# Patient Record
Sex: FEMALE | Race: WHITE | NOT HISPANIC OR LATINO | Employment: OTHER | URBAN - METROPOLITAN AREA
[De-identification: names, ages, dates, MRNs, and addresses within clinical notes are randomized per-mention and may not be internally consistent; named-entity substitution may affect disease eponyms.]

---

## 2017-02-20 ENCOUNTER — HISTORICAL (OUTPATIENT)
Dept: LAB | Facility: HOSPITAL | Age: 67
End: 2017-02-20

## 2021-01-04 ENCOUNTER — HISTORICAL (OUTPATIENT)
Dept: RADIOLOGY | Facility: HOSPITAL | Age: 71
End: 2021-01-04

## 2021-07-16 ENCOUNTER — HISTORICAL (OUTPATIENT)
Dept: RADIOLOGY | Facility: HOSPITAL | Age: 71
End: 2021-07-16

## 2022-03-04 ENCOUNTER — HISTORICAL (OUTPATIENT)
Dept: ADMINISTRATIVE | Facility: HOSPITAL | Age: 72
End: 2022-03-04

## 2022-03-04 ENCOUNTER — HISTORICAL (OUTPATIENT)
Dept: RADIOLOGY | Facility: HOSPITAL | Age: 72
End: 2022-03-04

## 2022-03-10 ENCOUNTER — HISTORICAL (OUTPATIENT)
Dept: LAB | Facility: HOSPITAL | Age: 72
End: 2022-03-10

## 2022-05-09 ENCOUNTER — HOSPITAL ENCOUNTER (OUTPATIENT)
Dept: RADIOLOGY | Facility: HOSPITAL | Age: 72
Discharge: HOME | End: 2022-05-09
Attending: SURGERY
Payer: MEDICARE

## 2022-05-09 DIAGNOSIS — Z01.811 PRE-OP CHEST EXAM: ICD-10-CM

## 2022-05-09 PROCEDURE — 71045 X-RAY EXAM CHEST 1 VIEW: CPT | Mod: TC

## 2022-05-09 RX ORDER — OMEPRAZOLE 40 MG/1
40 CAPSULE, DELAYED RELEASE ORAL EVERY MORNING
COMMUNITY
Start: 2022-05-05

## 2022-05-09 RX ORDER — LORAZEPAM 2 MG/1
2 TABLET ORAL 2 TIMES DAILY
COMMUNITY

## 2022-05-09 RX ORDER — FUROSEMIDE 20 MG/1
TABLET ORAL
COMMUNITY
Start: 2022-03-14

## 2022-05-09 RX ORDER — LISINOPRIL 10 MG/1
1 TABLET ORAL EVERY MORNING
COMMUNITY
Start: 2022-02-10

## 2022-05-09 RX ORDER — DICLOFENAC SODIUM 75 MG/1
75 TABLET, DELAYED RELEASE ORAL 2 TIMES DAILY
COMMUNITY
Start: 2022-01-10

## 2022-05-09 RX ORDER — METOPROLOL SUCCINATE 25 MG/1
25 TABLET, EXTENDED RELEASE ORAL 2 TIMES DAILY
COMMUNITY
Start: 2022-05-04

## 2022-05-09 RX ORDER — LOSARTAN POTASSIUM 100 MG/1
100 TABLET ORAL EVERY MORNING
COMMUNITY
Start: 2022-04-04

## 2022-05-09 RX ORDER — ATORVASTATIN CALCIUM 40 MG/1
40 TABLET, FILM COATED ORAL NIGHTLY
COMMUNITY
Start: 2022-02-14

## 2022-05-09 NOTE — DISCHARGE INSTRUCTIONS
Use CHG wipes as instructed. Follow bowel prep on Wednesday. Nothing to eat or drink after midnight for procedure on 5/12/22. Take Metoprolol and Losartan AM of procedure.

## 2022-05-11 ENCOUNTER — ANESTHESIA EVENT (OUTPATIENT)
Dept: SURGERY | Facility: HOSPITAL | Age: 72
End: 2022-05-11
Payer: MEDICARE

## 2022-05-11 NOTE — ANESTHESIA PREPROCEDURE EVALUATION
05/11/2022  Amira Burnette is a 71 y.o., female.      Pre-op Assessment    I have reviewed the Patient Summary Reports.     I have reviewed the Nursing Notes. I have reviewed the NPO Status.   I have reviewed the Medications.     Review of Systems  Anesthesia Hx:  No problems with previous Anesthesia Denies Hx of Anesthetic complications  Denies Family Hx of Anesthesia complications.   Denies Personal Hx of Anesthesia complications.   Social:  Non-Smoker, Alcohol Use    Hematology/Oncology:  Hematology Normal   Oncology Normal     EENT/Dental:EENT/Dental Normal   Cardiovascular:   Hypertension    Pulmonary:  Pulmonary Normal    Renal/:  Renal/ Normal     Hepatic/GI:  Hepatic/GI Normal    Musculoskeletal:  Musculoskeletal Normal    Neurological:  Neurology Normal    Endocrine:  Endocrine Normal    Dermatological:  Skin Normal    Psych:  Psychiatric Normal           Physical Exam  General: Alert, Cooperative and Oriented    Airway:  Mallampati: I   Mouth Opening: Normal  TM Distance: Normal  Tongue: Normal  Neck ROM: Normal ROM    Dental:  Intact        Anesthesia Plan  Type of Anesthesia, risks & benefits discussed:    Anesthesia Type: Gen ETT  Intra-op Monitoring Plan: Standard ASA Monitors  Post Op Pain Control Plan: multimodal analgesia  Induction:  IV  Informed Consent: Informed consent signed with the Patient and all parties understand the risks and agree with anesthesia plan.  All questions answered. Patient consented to blood products? Yes  ASA Score: 2  Anesthesia Plan Notes: 72 yo F sched for Laparoscopic ventral hernia repair  ASA II: HTN  Plan : GETA    Ready For Surgery From Anesthesia Perspective.     .

## 2022-05-12 ENCOUNTER — HOSPITAL ENCOUNTER (OUTPATIENT)
Facility: HOSPITAL | Age: 72
Discharge: HOME | End: 2022-05-13
Attending: SURGERY | Admitting: SURGERY
Payer: MEDICARE

## 2022-05-12 ENCOUNTER — ANESTHESIA (OUTPATIENT)
Dept: SURGERY | Facility: HOSPITAL | Age: 72
End: 2022-05-12
Payer: MEDICARE

## 2022-05-12 DIAGNOSIS — K43.9 VENTRAL HERNIA WITHOUT OBSTRUCTION OR GANGRENE: Primary | ICD-10-CM

## 2022-05-12 DIAGNOSIS — K43.9 VENTRAL HERNIA: ICD-10-CM

## 2022-05-12 LAB
ALBUMIN SERPL-MCNC: 3.7 GM/DL (ref 3.4–4.8)
ALBUMIN/GLOB SERPL: 1.3 RATIO (ref 1.1–2)
ALP SERPL-CCNC: 118 UNIT/L (ref 40–150)
ALT SERPL-CCNC: 6 UNIT/L (ref 0–55)
APTT PPP: 27.2 SECONDS (ref 23.2–33.7)
AST SERPL-CCNC: 20 UNIT/L (ref 5–34)
BASOPHILS # BLD AUTO: 0.02 X10(3)/MCL (ref 0–0.2)
BASOPHILS NFR BLD AUTO: 0.4 %
BILIRUBIN DIRECT+TOT PNL SERPL-MCNC: 0.4 MG/DL
BUN SERPL-MCNC: 11 MG/DL (ref 9.8–20.1)
CALCIUM SERPL-MCNC: 8.7 MG/DL (ref 8.4–10.2)
CHLORIDE SERPL-SCNC: 98 MMOL/L (ref 98–107)
CO2 SERPL-SCNC: 30 MMOL/L (ref 23–31)
CREAT SERPL-MCNC: 0.77 MG/DL (ref 0.55–1.02)
EOSINOPHIL # BLD AUTO: 0.08 X10(3)/MCL (ref 0–0.9)
EOSINOPHIL NFR BLD AUTO: 1.5 %
ERYTHROCYTE [DISTWIDTH] IN BLOOD BY AUTOMATED COUNT: 17.2 % (ref 11.5–17)
GLOBULIN SER-MCNC: 2.9 GM/DL (ref 2.4–3.5)
GLUCOSE SERPL-MCNC: 100 MG/DL (ref 82–115)
HCT VFR BLD AUTO: 24.1 % (ref 37–47)
HGB BLD-MCNC: 7.5 GM/DL (ref 12–16)
IMM GRANULOCYTES # BLD AUTO: 0.01 X10(3)/MCL (ref 0–0.02)
IMM GRANULOCYTES NFR BLD AUTO: 0.2 % (ref 0–0.43)
INR BLD: 1.05 (ref 0–1.3)
LYMPHOCYTES # BLD AUTO: 1.68 X10(3)/MCL (ref 0.6–4.6)
LYMPHOCYTES NFR BLD AUTO: 32.5 %
MCH RBC QN AUTO: 26.9 PG (ref 27–31)
MCHC RBC AUTO-ENTMCNC: 31.1 MG/DL (ref 33–36)
MCV RBC AUTO: 86.4 FL (ref 80–94)
MONOCYTES # BLD AUTO: 0.65 X10(3)/MCL (ref 0.1–1.3)
MONOCYTES NFR BLD AUTO: 12.6 %
NEUTROPHILS # BLD AUTO: 2.7 X10(3)/MCL (ref 2.1–9.2)
NEUTROPHILS NFR BLD AUTO: 52.8 %
PLATELET # BLD AUTO: 447 X10(3)/MCL (ref 130–400)
PMV BLD AUTO: 9.1 FL (ref 9.4–12.4)
POTASSIUM SERPL-SCNC: 4 MMOL/L (ref 3.5–5.1)
PROT SERPL-MCNC: 6.6 GM/DL (ref 5.8–7.6)
PROTHROMBIN TIME: 13.7 SECONDS (ref 12.5–14.5)
RBC # BLD AUTO: 2.79 X10(6)/MCL (ref 4.2–5.4)
SODIUM SERPL-SCNC: 133 MMOL/L (ref 136–145)
WBC # SPEC AUTO: 5.2 X10(3)/MCL (ref 4.5–11.5)

## 2022-05-12 PROCEDURE — 37000008 HC ANESTHESIA 1ST 15 MINUTES: Performed by: SURGERY

## 2022-05-12 PROCEDURE — 36000708 HC OR TIME LEV III 1ST 15 MIN: Performed by: SURGERY

## 2022-05-12 PROCEDURE — 36415 COLL VENOUS BLD VENIPUNCTURE: CPT | Performed by: SURGERY

## 2022-05-12 PROCEDURE — 94761 N-INVAS EAR/PLS OXIMETRY MLT: CPT

## 2022-05-12 PROCEDURE — 25000003 PHARM REV CODE 250: Performed by: NURSE ANESTHETIST, CERTIFIED REGISTERED

## 2022-05-12 PROCEDURE — 63600175 PHARM REV CODE 636 W HCPCS: Performed by: SURGERY

## 2022-05-12 PROCEDURE — 37000009 HC ANESTHESIA EA ADD 15 MINS: Performed by: SURGERY

## 2022-05-12 PROCEDURE — 80053 COMPREHEN METABOLIC PANEL: CPT | Performed by: SURGERY

## 2022-05-12 PROCEDURE — 27201423 OPTIME MED/SURG SUP & DEVICES STERILE SUPPLY: Performed by: SURGERY

## 2022-05-12 PROCEDURE — 71000033 HC RECOVERY, INTIAL HOUR: Performed by: SURGERY

## 2022-05-12 PROCEDURE — 63600175 PHARM REV CODE 636 W HCPCS: Performed by: NURSE ANESTHETIST, CERTIFIED REGISTERED

## 2022-05-12 PROCEDURE — 85610 PROTHROMBIN TIME: CPT | Performed by: SURGERY

## 2022-05-12 PROCEDURE — 25000003 PHARM REV CODE 250: Performed by: SURGERY

## 2022-05-12 PROCEDURE — 63600175 PHARM REV CODE 636 W HCPCS: Performed by: ANESTHESIOLOGY

## 2022-05-12 PROCEDURE — 85025 COMPLETE CBC W/AUTO DIFF WBC: CPT | Performed by: SURGERY

## 2022-05-12 PROCEDURE — 94799 UNLISTED PULMONARY SVC/PX: CPT

## 2022-05-12 PROCEDURE — G0378 HOSPITAL OBSERVATION PER HR: HCPCS

## 2022-05-12 PROCEDURE — 25000003 PHARM REV CODE 250: Performed by: ANESTHESIOLOGY

## 2022-05-12 PROCEDURE — 25000003 PHARM REV CODE 250

## 2022-05-12 PROCEDURE — C1781 MESH (IMPLANTABLE): HCPCS | Performed by: SURGERY

## 2022-05-12 PROCEDURE — 85730 THROMBOPLASTIN TIME PARTIAL: CPT | Performed by: SURGERY

## 2022-05-12 PROCEDURE — 36000709 HC OR TIME LEV III EA ADD 15 MIN: Performed by: SURGERY

## 2022-05-12 DEVICE — MESH SYMBOTEX HERNIA 20X12CM: Type: IMPLANTABLE DEVICE | Site: ABDOMEN | Status: FUNCTIONAL

## 2022-05-12 RX ORDER — EPHEDRINE SULFATE 50 MG/ML
INJECTION, SOLUTION INTRAVENOUS
Status: DISCONTINUED | OUTPATIENT
Start: 2022-05-12 | End: 2022-05-12

## 2022-05-12 RX ORDER — ROCURONIUM BROMIDE 10 MG/ML
INJECTION, SOLUTION INTRAVENOUS
Status: DISCONTINUED | OUTPATIENT
Start: 2022-05-12 | End: 2022-05-12

## 2022-05-12 RX ORDER — SODIUM CHLORIDE, SODIUM LACTATE, POTASSIUM CHLORIDE, CALCIUM CHLORIDE 600; 310; 30; 20 MG/100ML; MG/100ML; MG/100ML; MG/100ML
INJECTION, SOLUTION INTRAVENOUS CONTINUOUS
Status: ACTIVE | OUTPATIENT
Start: 2022-05-12

## 2022-05-12 RX ORDER — CEFAZOLIN SODIUM 1 G/3ML
INJECTION, POWDER, FOR SOLUTION INTRAMUSCULAR; INTRAVENOUS
Status: DISCONTINUED | OUTPATIENT
Start: 2022-05-12 | End: 2022-05-12

## 2022-05-12 RX ORDER — ONDANSETRON 2 MG/ML
INJECTION INTRAMUSCULAR; INTRAVENOUS
Status: DISCONTINUED | OUTPATIENT
Start: 2022-05-12 | End: 2022-05-12

## 2022-05-12 RX ORDER — ACETAMINOPHEN 500 MG
1000 TABLET ORAL
Status: COMPLETED | OUTPATIENT
Start: 2022-05-12 | End: 2022-05-12

## 2022-05-12 RX ORDER — SODIUM CHLORIDE 0.9 % (FLUSH) 0.9 %
10 SYRINGE (ML) INJECTION
Status: ACTIVE | OUTPATIENT
Start: 2022-05-12

## 2022-05-12 RX ORDER — CEFAZOLIN SODIUM 2 G/50ML
2 SOLUTION INTRAVENOUS
Status: DISPENSED | OUTPATIENT
Start: 2022-05-12 | End: 2022-05-13

## 2022-05-12 RX ORDER — PROPOFOL 10 MG/ML
VIAL (ML) INTRAVENOUS
Status: DISCONTINUED | OUTPATIENT
Start: 2022-05-12 | End: 2022-05-12

## 2022-05-12 RX ORDER — HYDROCODONE BITARTRATE AND ACETAMINOPHEN 5; 325 MG/1; MG/1
1 TABLET ORAL EVERY 8 HOURS PRN
Status: DISCONTINUED | OUTPATIENT
Start: 2022-05-12 | End: 2022-05-13 | Stop reason: HOSPADM

## 2022-05-12 RX ORDER — DEXTROSE MONOHYDRATE AND SODIUM CHLORIDE 5; .45 G/100ML; G/100ML
INJECTION, SOLUTION INTRAVENOUS CONTINUOUS
Status: DISCONTINUED | OUTPATIENT
Start: 2022-05-12 | End: 2022-05-13 | Stop reason: HOSPADM

## 2022-05-12 RX ORDER — BUPIVACAINE HYDROCHLORIDE 2.5 MG/ML
INJECTION, SOLUTION EPIDURAL; INFILTRATION; INTRACAUDAL
Status: DISCONTINUED | OUTPATIENT
Start: 2022-05-12 | End: 2022-05-12 | Stop reason: HOSPADM

## 2022-05-12 RX ORDER — SODIUM CHLORIDE 9 MG/ML
INJECTION, SOLUTION INTRAVENOUS CONTINUOUS
Status: DISCONTINUED | OUTPATIENT
Start: 2022-05-12 | End: 2022-05-13 | Stop reason: HOSPADM

## 2022-05-12 RX ORDER — HYDROMORPHONE HYDROCHLORIDE 2 MG/ML
0.5 INJECTION, SOLUTION INTRAMUSCULAR; INTRAVENOUS; SUBCUTANEOUS EVERY 6 HOURS PRN
Status: DISCONTINUED | OUTPATIENT
Start: 2022-05-12 | End: 2022-05-13 | Stop reason: HOSPADM

## 2022-05-12 RX ORDER — FENTANYL CITRATE 50 UG/ML
INJECTION, SOLUTION INTRAMUSCULAR; INTRAVENOUS
Status: DISCONTINUED | OUTPATIENT
Start: 2022-05-12 | End: 2022-05-12

## 2022-05-12 RX ORDER — DEXAMETHASONE SODIUM PHOSPHATE 4 MG/ML
INJECTION, SOLUTION INTRA-ARTICULAR; INTRALESIONAL; INTRAMUSCULAR; INTRAVENOUS; SOFT TISSUE
Status: DISCONTINUED | OUTPATIENT
Start: 2022-05-12 | End: 2022-05-12

## 2022-05-12 RX ORDER — TRAMADOL HYDROCHLORIDE 50 MG/1
50 TABLET ORAL
Status: COMPLETED | OUTPATIENT
Start: 2022-05-12 | End: 2022-05-12

## 2022-05-12 RX ADMIN — TRAMADOL HYDROCHLORIDE 50 MG: 50 TABLET, COATED ORAL at 01:05

## 2022-05-12 RX ADMIN — SODIUM CHLORIDE: 9 INJECTION, SOLUTION INTRAVENOUS at 08:05

## 2022-05-12 RX ADMIN — DEXAMETHASONE SODIUM PHOSPHATE 4 MG: 4 INJECTION, SOLUTION INTRA-ARTICULAR; INTRALESIONAL; INTRAMUSCULAR; INTRAVENOUS; SOFT TISSUE at 05:05

## 2022-05-12 RX ADMIN — ROCURONIUM BROMIDE 40 MG: 10 INJECTION, SOLUTION INTRAVENOUS at 04:05

## 2022-05-12 RX ADMIN — HYDROMORPHONE HYDROCHLORIDE 0.5 MG: 2 INJECTION, SOLUTION INTRAMUSCULAR; INTRAVENOUS; SUBCUTANEOUS at 10:05

## 2022-05-12 RX ADMIN — ONDANSETRON 4 MG: 2 INJECTION INTRAMUSCULAR; INTRAVENOUS at 05:05

## 2022-05-12 RX ADMIN — EPHEDRINE SULFATE 20 MG: 50 INJECTION INTRAVENOUS at 05:05

## 2022-05-12 RX ADMIN — ACETAMINOPHEN 1000 MG: 500 TABLET, FILM COATED ORAL at 01:05

## 2022-05-12 RX ADMIN — CEFAZOLIN 2 G: 330 INJECTION, POWDER, FOR SOLUTION INTRAMUSCULAR; INTRAVENOUS at 04:05

## 2022-05-12 RX ADMIN — SODIUM CHLORIDE, POTASSIUM CHLORIDE, SODIUM LACTATE AND CALCIUM CHLORIDE: 600; 310; 30; 20 INJECTION, SOLUTION INTRAVENOUS at 01:05

## 2022-05-12 RX ADMIN — SUGAMMADEX 140 MG: 100 INJECTION, SOLUTION INTRAVENOUS at 05:05

## 2022-05-12 RX ADMIN — FENTANYL CITRATE 100 MCG: 50 INJECTION, SOLUTION INTRAMUSCULAR; INTRAVENOUS at 04:05

## 2022-05-12 RX ADMIN — PROPOFOL 140 MG: 10 INJECTION, EMULSION INTRAVENOUS at 04:05

## 2022-05-12 NOTE — OP NOTE
[facility] - Periop Services  Brief Operative Note    SUMMARY     Surgery Date: 5/12/2022     Surgeon(s) and Role:     * Jefferson Grant MD - Primary    Assisting Surgeon: None    Pre-op Diagnosis:  Ventral hernia without obstruction or gangrene [K43.9]    Post-op Diagnosis:  Post-Op Diagnosis Codes:     * Ventral hernia without obstruction or gangrene [K43.9] hernia with incisional type initial onset    Procedure(s) (LRB):  REPAIR, HERNIA, VENTRAL, LAPAROSCOPIC (N/A) with a 12 x 20 cm Symbotex mesh    Anesthesia: General    Operative Findings:  Patient is a 71-year-old white female with hypertension hypercholesterolemia had a laparoscopic right hemicolectomy in 2017 developed a incisional umbilical hernia that was reducible nonobstructed. the hernia was initial onset.  Patient had cardiac clearance by Dr. Smith preoperatively.  Ultrasound of the abdomen on 03/04/2022 showed 3 ventral hernias the largest 5.7 cm they were causing her significant pain .  CT scan demonstrated a ventral hernia.  Last EGD colonoscopy was in 2014. Stools for blood were negative on 03/10/2022.  Patient did have a CT scan abdomen pelvis that showed some sigmoid colon narrowing of the bleed secondary to the herniation.  Follow-up colonoscopy be done at a later date.    Patient was brought out from supine position general anesthetic prepped and draped in sterile fashion.  Patient then underwent insertion of a Veress needle in the left upper quadrant subcostally and then placement of a Visiport under direct vision.  Patient had a 5 mm trocar placed laterally along the mid subcostal region on the left side.  Patient then had a Visiport placed and dissection of the adhesions that were caught post surrounding the hernia defect lysis of dense adhesions that required additional time the case was was accomplished.  Patient then had reduction of the hernia defect there was a Swiss-cheese distribution along the midline of the abdomen the  largest being inferiorly above the umbilicus and smaller ones being near the xiphoid process the patient had coverage of this area that extended about 12-15 cm with a 20 x 12 cm Symbotex mesh the width of the hernia defects were about 3 or 4 cm the length of the hernia defects were approximately 10-15 cm I felt the 20 x 12 cm mesh would be appropriate for coverage patient had a nonadhesive side toward the bowel and the adhesive side toward the fascia.  Patient underwent removal of the 5 mm trocars.  Patient then underwent closure of the trocar sites with a 0 Vicryl on  needle for the fascia and 4-0 plain gut suture for the skin and subcu doubt dressings were applied no problems encountered patient tolerated procedure well I appreciate consultation from Dr. Guerrero Stiles and will notify my finding    Estimated Blood Loss: * No values recorded between 5/12/2022  4:55 PM and 5/12/2022  5:48 PM *    Estimated Blood Loss has been documented.         Specimens:   Specimen (24h ago, onward)            None          [id number]

## 2022-05-12 NOTE — DISCHARGE SUMMARY
[facility] - Periop Services  Discharge Note  Short Stay    Procedure(s) (LRB):  REPAIR, HERNIA, VENTRAL, LAPAROSCOPIC (N/A)    OUTCOME: Patient tolerated treatment/procedure well without complication and is now ready for discharge.    DISPOSITION: Home or Self Care    FINAL DIAGNOSIS:  <principal problem not specified>    FOLLOWUP: In clinic    DISCHARGE INSTRUCTIONS:  No discharge procedures on file.     TIME SPENT ON DISCHARGE:20 minutes

## 2022-05-12 NOTE — ANESTHESIA PROCEDURE NOTES
Intubation    Date/Time: 5/12/2022 4:39 PM  Performed by: Mauro Valenzuela CRNA  Authorized by: Fabio Lau DO     Intubation:     Induction:  Intravenous    Intubated:  Postinduction    Mask Ventilation:  Easy mask    Attempts:  1    Attempted By:  CRNA    Method of Intubation:  Direct    Blade:  Gomez 2    Laryngeal View Grade: Grade I - full view of cords      Difficult Airway Encountered?: No      Complications:  None    Airway Device:  Oral endotracheal tube    Airway Device Size:  7.5    Style/Cuff Inflation:  Cuffed (inflated to minimal occlusive pressure)    Inflation Amount (mL):  5    Secured at:  The lips    Placement Verified By:  Capnometry and Colorimetric ETCO2 device    Complicating Factors:  None    Findings Post-Intubation:  BS equal bilateral and atraumatic/condition of teeth unchanged

## 2022-05-12 NOTE — ANESTHESIA POSTPROCEDURE EVALUATION
Anesthesia Post Evaluation    Patient: Amira Burnette    Procedure(s) Performed: Procedure(s) (LRB):  REPAIR, HERNIA, VENTRAL, LAPAROSCOPIC (N/A)    Final Anesthesia Type: general      Patient location during evaluation: PACU  Patient participation: Yes- Able to Participate  Level of consciousness: awake and alert  Post-procedure vital signs: reviewed and stable  Pain management: adequate  Airway patency: patent    PONV status at discharge: No PONV  Anesthetic complications: no      Cardiovascular status: blood pressure returned to baseline  Respiratory status: unassisted  Hydration status: euvolemic  Follow-up not needed.          Vitals Value Taken Time   /80 05/12/22 1805   Temp 36.3 05/12/22 1805   Pulse 75 05/12/22 1805   Resp 17 05/12/22 1805   SpO2 94 05/12/22 1805         No case tracking events are documented in the log.      Pain/Samara Score: Pain Rating Prior to Med Admin: 4 (5/12/2022  1:10 PM)

## 2022-05-13 VITALS
DIASTOLIC BLOOD PRESSURE: 68 MMHG | SYSTOLIC BLOOD PRESSURE: 196 MMHG | HEART RATE: 81 BPM | BODY MASS INDEX: 30.24 KG/M2 | HEIGHT: 59 IN | TEMPERATURE: 99 F | OXYGEN SATURATION: 93 % | WEIGHT: 150 LBS | RESPIRATION RATE: 18 BRPM

## 2022-05-13 PROCEDURE — 94761 N-INVAS EAR/PLS OXIMETRY MLT: CPT

## 2022-05-13 PROCEDURE — 94799 UNLISTED PULMONARY SVC/PX: CPT

## 2022-05-13 PROCEDURE — G0378 HOSPITAL OBSERVATION PER HR: HCPCS

## 2022-05-13 PROCEDURE — 25000003 PHARM REV CODE 250: Performed by: SURGERY

## 2022-05-13 PROCEDURE — 99900035 HC TECH TIME PER 15 MIN (STAT)

## 2022-05-13 RX ADMIN — HYDROCODONE BITARTRATE AND ACETAMINOPHEN 1 TABLET: 5; 325 TABLET ORAL at 08:05

## 2022-05-13 NOTE — NURSING
Appointment made for patient to follow up with Dr. NAGI Grant for Thursday, May 19, 2022 at 8:45am.     Patient informed on discharge. She is currently awaiting her ride. States her daughter is coming to pick her up.

## 2022-10-12 ENCOUNTER — HOSPITAL ENCOUNTER (EMERGENCY)
Facility: HOSPITAL | Age: 72
Discharge: HOME | End: 2022-10-12
Attending: FAMILY MEDICINE
Payer: MEDICARE

## 2022-10-12 VITALS
SYSTOLIC BLOOD PRESSURE: 137 MMHG | HEART RATE: 82 BPM | WEIGHT: 154.31 LBS | TEMPERATURE: 98 F | RESPIRATION RATE: 16 BRPM | BODY MASS INDEX: 27.34 KG/M2 | HEIGHT: 63 IN | OXYGEN SATURATION: 99 % | DIASTOLIC BLOOD PRESSURE: 60 MMHG
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
## 2022-10-12 DIAGNOSIS — S52.592A OTHER CLOSED FRACTURE OF DISTAL END OF LEFT RADIUS, INITIAL ENCOUNTER: ICD-10-CM

## 2022-10-12 DIAGNOSIS — S32.9XXA CLOSED NONDISPLACED FRACTURE OF PELVIS, UNSPECIFIED PART OF PELVIS, INITIAL ENCOUNTER: Primary | ICD-10-CM

## 2022-10-12 PROCEDURE — 29105 APPLICATION LONG ARM SPLINT: CPT | Mod: LT

## 2022-10-12 PROCEDURE — 29125 APPL SHORT ARM SPLINT STATIC: CPT | Mod: LT

## 2022-10-12 PROCEDURE — 99284 EMERGENCY DEPT VISIT MOD MDM: CPT | Mod: 25

## 2022-10-12 PROCEDURE — 25000003 PHARM REV CODE 250: Performed by: PHYSICIAN ASSISTANT

## 2022-10-12 RX ORDER — ACETAMINOPHEN AND CODEINE PHOSPHATE 300; 30 MG/1; MG/1
1 TABLET ORAL EVERY 8 HOURS PRN
Qty: 12 TABLET | Refills: 0 | Status: SHIPPED | OUTPATIENT
Start: 2022-10-12 | End: 2022-10-12 | Stop reason: SDUPTHER

## 2022-10-12 RX ORDER — ACETAMINOPHEN AND CODEINE PHOSPHATE 300; 30 MG/1; MG/1
1 TABLET ORAL
Status: COMPLETED | OUTPATIENT
Start: 2022-10-12 | End: 2022-10-12

## 2022-10-12 RX ORDER — ACETAMINOPHEN AND CODEINE PHOSPHATE 300; 30 MG/1; MG/1
1 TABLET ORAL EVERY 8 HOURS PRN
Qty: 12 TABLET | Refills: 0 | Status: SHIPPED | OUTPATIENT
Start: 2022-10-12 | End: 2022-10-17

## 2022-10-12 RX ADMIN — ACETAMINOPHEN AND CODEINE PHOSPHATE 1 TABLET: 300; 30 TABLET ORAL at 05:10

## 2022-10-12 NOTE — ED PROVIDER NOTES
Encounter Date: 10/12/2022       History     Chief Complaint   Patient presents with    Arm Injury     Pt reports left forearm fracture three weeks ago after falling. Pt feels pain is worsening on left arm and needs ortho referral.      Patient reports to the ER with her daughter due to a fall x3 weeks that caused a break in her forearm (unknown what bone); pt was referred to an Orthopedist but due to transportation issues the family was unable to go to the specialist; the patient also reports low back pain after the same fall    The history is provided by the patient.   Arm Injury   The incident occurred several weeks ago. The incident occurred at home. The injury mechanism was a fall. No protective equipment was used. There is an injury to the Lower back. There is an injury to the Right wrist. She is Right-handed. Her tetanus status is UTD. She has been Behaving normally. Recently, medical care has been given at another facility. Services received include tests performed. Pertinent negatives include no chest pain, no altered mental status, no numbness, no visual disturbance, no abdominal pain, no bowel incontinence, no nausea, no vomiting, no headaches, no hearing loss, no inability to bear weight, no neck pain, no pain when bearing weight, no focal weakness, no decreased responsiveness, no seizures, no tingling, no weakness, no cough and no difficulty breathing.   Review of patient's allergies indicates:   Allergen Reactions    Gabapentin Other (See Comments)    Diphenhydramine Anxiety     Past Medical History:   Diagnosis Date    Anxiety     High cholesterol     HTN (hypertension)      Past Surgical History:   Procedure Laterality Date    COLONOSCOPY      LAPAROSCOPIC REPAIR OF VENTRAL HERNIA N/A 5/12/2022    Procedure: REPAIR, HERNIA, VENTRAL, LAPAROSCOPIC;  Surgeon: Jefferson Grant MD;  Location: [facility];  Service: General;  Laterality: N/A;    TONSILLECTOMY      TOTAL SHOULDER ARTHROPLASTY Right       Family History   Problem Relation Age of Onset    No Known Problems Mother     Alzheimer's disease Father      Social History     Tobacco Use    Smoking status: Never    Smokeless tobacco: Never   Substance Use Topics    Alcohol use: Yes     Alcohol/week: 6.0 standard drinks     Types: 6 Cans of beer per week    Drug use: Never     Review of Systems   Constitutional:  Negative for decreased responsiveness and fever.   HENT:  Negative for hearing loss and sore throat.    Eyes: Negative.  Negative for visual disturbance.   Respiratory:  Negative for cough and shortness of breath.    Cardiovascular:  Negative for chest pain.   Gastrointestinal:  Negative for abdominal pain, bowel incontinence, nausea and vomiting.   Genitourinary:  Negative for dysuria.   Musculoskeletal:  Positive for back pain. Negative for neck pain.   Skin:  Negative for rash.   Neurological:  Negative for tingling, focal weakness, seizures, weakness, numbness and headaches.   Hematological:  Does not bruise/bleed easily.   Psychiatric/Behavioral: Negative.       Physical Exam     Initial Vitals [10/12/22 1637]   BP Pulse Resp Temp SpO2   (!) 142/68 78 16 98.2 °F (36.8 °C) 99 %      MAP       --         Physical Exam    Vitals reviewed.  Constitutional: She appears well-developed and well-nourished.   HENT:   Head: Normocephalic and atraumatic.   Eyes: Conjunctivae and EOM are normal. Pupils are equal, round, and reactive to light.   Neck: Neck supple.   Normal range of motion.  Cardiovascular:  Normal rate, regular rhythm and normal heart sounds.           Pulmonary/Chest: Breath sounds normal. No respiratory distress. She has no wheezes. She exhibits no tenderness.   Abdominal: Abdomen is soft. Bowel sounds are normal. There is no abdominal tenderness.   Musculoskeletal:      Right forearm: No swelling or tenderness.      Left forearm: Tenderness present. No swelling.      Right wrist: Normal. No tenderness. Normal range of motion. Normal  "pulse.      Left wrist: Tenderness present. Decreased range of motion. Normal pulse.        Arms:       Cervical back: Normal, normal range of motion and neck supple.      Thoracic back: Normal.      Lumbar back: Tenderness present. Decreased range of motion.        Back:      Neurological: She is alert and oriented to person, place, and time. She displays normal reflexes. No cranial nerve deficit or sensory deficit.   Skin: Skin is warm and dry.   Psychiatric: She has a normal mood and affect. Her behavior is normal. Judgment and thought content normal.       ED Course   Splint Application    Date/Time: 10/12/2022 7:30 PM  Performed by: DANYELLE Delcid  Authorized by: Chris Steward MD   Consent Done: Yes  Consent: Verbal consent obtained.  Risks and benefits: risks, benefits and alternatives were discussed  Consent given by: patient  Patient understanding: patient states understanding of the procedure being performed  Patient consent: the patient's understanding of the procedure matches consent given  Procedure consent: procedure consent matches procedure scheduled  Relevant documents: relevant documents present and verified  Test results: test results available and properly labeled  Site marked: the operative site was marked  Imaging studies: imaging studies available  Required items: required blood products, implants, devices, and special equipment available  Patient identity confirmed: , name, verbally with patient and MRN  Time out: Immediately prior to procedure a "time out" was called to verify the correct patient, procedure, equipment, support staff and site/side marked as required.  Location details: left wrist  Splint type: sugar tong  Supplies used: Ortho-Glass  Post-procedure: The splinted body part was neurovascularly unchanged following the procedure.  Patient tolerance: Patient tolerated the procedure well with no immediate complications      Labs Reviewed - No data to display   "     Imaging Results              X-Ray Forearm Left (Final result)  Result time 10/12/22 18:32:48      Final result by Cary Gomes MD (10/12/22 18:32:48)                   Impression:      Fracture of the distal radius with dorsal angulation      Electronically signed by: Cary Gomes  Date:    10/12/2022  Time:    18:32               Narrative:    EXAMINATION:  XR FOREARM LEFT    CLINICAL HISTORY:  Unspecified fall, initial encounter    TECHNIQUE:  AP and lateral views of the left forearm were performed.    COMPARISON:  None    FINDINGS:  There is a fracture distal radial metaphysis extending to the epiphysis with dorsal angulation.  No other fractures are seen.  There is soft tissue swelling in the wrist.                                       X-Ray Wrist Complete Left (Final result)  Result time 10/12/22 18:32:08   Procedure changed from X-Ray Wrist Complete Right     Final result by Claudia Huizar MD (10/12/22 18:32:08)                   Impression:      Distal radial and ulnar styloid process fractures.      Electronically signed by: Claudia Huizar  Date:    10/12/2022  Time:    18:32               Narrative:    EXAMINATION:  XR WRIST COMPLETE 3 VIEWS LEFT    CLINICAL HISTORY:  Unspecified fall, initial encounter fall;    COMPARISON:  None.    FINDINGS:  There is a comminuted intra-articular fracture of the distal radius with dorsal angulation.  There is also a fracture of the ulnar styloid process.  Mild soft tissue swelling is noted.                                       X-Ray Lumbar Spine Ap And Lateral (Final result)  Result time 10/12/22 18:31:34      Final result by Claudia Huizar MD (10/12/22 18:31:34)                   Impression:      No acute abnormality identified.      Electronically signed by: Claudia Huizar  Date:    10/12/2022  Time:    18:31               Narrative:    EXAMINATION:  XR LUMBAR SPINE AP AND LATERAL    CLINICAL HISTORY:  fall;    COMPARISON:  MRI  lumbar spine dated 01/04/2021    FINDINGS:  There are 5 non-rib-bearing lumbar vertebral bodies.  Alignment is preserved.  There are stable compression deformities at T11, L1, L2 and L4, with severe loss of height at L4.  There are multilevel degenerative changes with small marginal osteophytes facet arthropathy.  The soft tissues are unremarkable.                                       X-Ray Hip 2 or 3 views Right (with Pelvis when performed) (Final result)  Result time 10/12/22 18:30:30      Final result by Cary Gomes MD (10/12/22 18:30:30)                   Impression:      Fracture of the superior and inferior pubic ramus on the right      Electronically signed by: Cary Gomes  Date:    10/12/2022  Time:    18:30               Narrative:    EXAMINATION:  XR HIP WITH PELVIS WHEN PERFORMED, 2 OR 3  VIEWS RIGHT    CLINICAL HISTORY:  Unspecified fall, initial encounter    TECHNIQUE:  AP view of the pelvis and frog leg lateral view of the right hip were performed.    COMPARISON:  None    FINDINGS:  There is a fracture of the superior and inferior pubic ramus on the right side.  There is some displacement seen.  No other fractures are seen.  CT scan pelvis may be beneficial for correlation.                                       Medications   acetaminophen-codeine 300-30mg per tablet 1 tablet (1 tablet Oral Given 10/12/22 1734)                 ED Course as of 10/12/22 2017   Wed Oct 12, 2022   2000 After reviewing chart, it became evident that patient did have previous imaging of the hip that showed the pelvic fracture and patient was offered admit at [facility], but she declined - will refer to Orthopedist that does hips [AL]      ED Course User Index  [AL] DANYELLE Delcid                 Clinical Impression:   Final diagnoses:  [W19.XXXA] Fall  [W19.XXXA] Fall - reported fracture, unsure which bone, suspect distal radius  [W19.XXXA] Fall - fall x3 weeks reported fracture, unsure which bone, suspect  distal radius  [S32.9XXA] Closed nondisplaced fracture of pelvis, unspecified part of pelvis, initial encounter (Primary)  [S52.592A] Other closed fracture of distal end of left radius, initial encounter      ED Disposition Condition    Discharge Stable          ED Prescriptions       Medication Sig Dispense Start Date End Date Auth. Provider    acetaminophen-codeine 300-30mg (TYLENOL #3) 300-30 mg Tab  (Status: Discontinued) Take 1 tablet by mouth every 8 (eight) hours as needed. 12 tablet 10/12/2022 10/12/2022 DANYELLE Delcid    acetaminophen-codeine 300-30mg (TYLENOL #3) 300-30 mg Tab Take 1 tablet by mouth every 8 (eight) hours as needed (pain). 12 tablet 10/12/2022 10/17/2022 DANYELLE Delcid          Follow-up Information       Follow up With Specialties Details Why Contact Info    discharge followup    If your symptoms become WORSE or you DO NOT IMPROVE and you are unable to reach your health care provider, you should RETURN to the emergency department    discharge info    Discussed all pertinent ED information, results, diagnosis and treatment plan; All questions and concerns were addressed at this time. Patient voices understanding of information and instructions. Patient is comfortable with plan and discharge             DANYELLE Delcid  10/12/22 2018

## 2022-10-17 ENCOUNTER — OFFICE VISIT (OUTPATIENT)
Dept: ORTHOPEDICS | Facility: CLINIC | Age: 72
End: 2022-10-17
Payer: MEDICARE

## 2022-10-17 ENCOUNTER — HOSPITAL ENCOUNTER (OUTPATIENT)
Dept: RADIOLOGY | Facility: HOSPITAL | Age: 72
Discharge: HOME | End: 2022-10-17
Attending: ORTHOPAEDIC SURGERY
Payer: MEDICARE

## 2022-10-17 VITALS
TEMPERATURE: 98 F | HEART RATE: 75 BPM | BODY MASS INDEX: 27.11 KG/M2 | DIASTOLIC BLOOD PRESSURE: 72 MMHG | WEIGHT: 153 LBS | SYSTOLIC BLOOD PRESSURE: 122 MMHG

## 2022-10-17 DIAGNOSIS — S52.572A OTHER CLOSED INTRA-ARTICULAR FRACTURE OF DISTAL END OF LEFT RADIUS, INITIAL ENCOUNTER: Primary | ICD-10-CM

## 2022-10-17 DIAGNOSIS — S52.572A OTHER CLOSED INTRA-ARTICULAR FRACTURE OF DISTAL END OF LEFT RADIUS, INITIAL ENCOUNTER: ICD-10-CM

## 2022-10-17 PROCEDURE — 73110 X-RAY EXAM OF WRIST: CPT | Mod: TC,LT

## 2022-10-17 PROCEDURE — 99213 OFFICE O/P EST LOW 20 MIN: CPT | Mod: PBBFAC

## 2022-10-17 PROCEDURE — 99204 PR OFFICE/OUTPT VISIT, NEW, LEVL IV, 45-59 MIN: ICD-10-PCS | Mod: S$PBB,,, | Performed by: ORTHOPAEDIC SURGERY

## 2022-10-17 PROCEDURE — 99204 OFFICE O/P NEW MOD 45 MIN: CPT | Mod: S$PBB,,, | Performed by: ORTHOPAEDIC SURGERY

## 2022-10-17 RX ORDER — MELOXICAM 7.5 MG/1
7.5 TABLET ORAL DAILY
Qty: 14 TABLET | Refills: 0 | Status: SHIPPED | OUTPATIENT
Start: 2022-10-17 | End: 2022-10-31

## 2022-10-17 NOTE — PROGRESS NOTES
ATTENDING ADDENDUM    Amira Burnette  was evaluated at the time of the encounter with Dr Markham PGY3.  HPI, PE and treatment plan was reviewed. Treatment plan was reasonable and necessary. Imaging was reviewed at the time of visit.     I was present during critical or key resident-provided service and procedure portions of the visit.

## 2022-10-17 NOTE — PROGRESS NOTES
[facility]  New Patient Office Visit  10/17/2022       Patient ID: Amira Burnette  YOB: 1950  MRN: [id number]    Diagnosis:    The encounter diagnosis was Other closed intra-articular fracture of distal end of left radius, initial encounter.     Chief Complaint: Injury of the Left Shoulder      Amira Burnette is a 72 y.o. female who presents as a new patient for treatment of the above mentioned diagnosis.  Patient presenting for initial evaluation of a left distal radius fracture.  She states that she fell in the house about 1 month ago.  She presents to the ED where images were taken that showed the fracture and she was referred for further follow-up.  Today she states that her pain is overall well controlled.  She does have some pain with wrist range of motion but otherwise doing okay.  She is stiff due to being in a splint for 3-4 weeks.  She has good finger range of motion.  Denies numbness or tingling.    Occupation: n/a  Sports/Hobbies: n/a   Hand dominance: right handed  Smoking: n/a    Past Medical History:    Past Medical History:   Diagnosis Date    Anxiety     High cholesterol     HTN (hypertension)      Past Surgical History:   Procedure Laterality Date    COLONOSCOPY      LAPAROSCOPIC REPAIR OF VENTRAL HERNIA N/A 5/12/2022    Procedure: REPAIR, HERNIA, VENTRAL, LAPAROSCOPIC;  Surgeon: Jefferson Grant MD;  Location: [facility];  Service: General;  Laterality: N/A;    TONSILLECTOMY      TOTAL SHOULDER ARTHROPLASTY Right      Family History   Problem Relation Age of Onset    No Known Problems Mother     Alzheimer's disease Father      Social History     Socioeconomic History    Marital status:    Tobacco Use    Smoking status: Never    Smokeless tobacco: Never   Substance and Sexual Activity    Alcohol use: Yes     Alcohol/week: 6.0 standard drinks     Types: 6 Cans of beer per week    Drug use: Never    Sexual activity: Not Currently     Medication  List with Changes/Refills   Current Medications    ACETAMINOPHEN-CODEINE 300-30MG (TYLENOL #3) 300-30 MG TAB    Take 1 tablet by mouth every 8 (eight) hours as needed (pain).    ATORVASTATIN (LIPITOR) 40 MG TABLET    Take 40 mg by mouth every evening.    DICLOFENAC (VOLTAREN) 75 MG EC TABLET    Take 75 mg by mouth 2 (two) times daily.    FUROSEMIDE (LASIX) 20 MG TABLET    TAKE 1 TABLET (20 MG) BY ORAL ROUTE DAILY IN THE MORNING    LISINOPRIL 10 MG TABLET    Take 1 tablet by mouth every morning.    LORAZEPAM (ATIVAN) 2 MG TAB    Take 2 mg by mouth 2 (two) times a day.    LOSARTAN (COZAAR) 100 MG TABLET    Take 100 mg by mouth every morning.    METOPROLOL SUCCINATE (TOPROL-XL) 25 MG 24 HR TABLET    Take 25 mg by mouth 2 (two) times a day.    OMEPRAZOLE (PRILOSEC) 40 MG CAPSULE    Take 40 mg by mouth every morning.     Review of patient's allergies indicates:   Allergen Reactions    Gabapentin Other (See Comments)    Diphenhydramine Anxiety       ROS:    Body mass index is 27.11 kg/m².  GENERAL: Well appearing, appropriate for stated age, no acute distress.  CARDIOVASCULAR: Pulses regular by peripheral palpation.  PULMONARY: Respirations are even and non-labored.  NEURO: Awake, alert, and oriented x 3.  PSYCH: Mood & affect are appropriate.  HEENT: Head is normocephalic and atraumatic.    Physical Exam:    Left upper extremity   No abrasions or open wounds, no gross deformity  Mild tenderness to palpation over the distal radius   Motor intact AIN/PIN/ulnar   Sensation intact to light touch distally   Wrist ROM decreased compared to contralateral side   Hand warm well perfused    Imaging:    X-rays of the left wrist obtained and interpreted today demonstrate displaced left distal radius fracture with dorsal angulation with some evidence of healing    Relevant imaging results reviewed and interpreted by me, discussed with the patient and / or family today.     Assessment and Plan:    Amira Burnette is a 72 y.o. female  seen in the office today for The encounter diagnosis was Other closed intra-articular fracture of distal end of left radius, initial encounter.    Discussion with the patient about treatment for the left distal radius fracture. Offered the patient ORIF left distal radius, however patient says her pain is much improved and wants to avoid surgery. We will plan for nonoperative management as she is doing okay at this point.  Provided Velcro wrist brace today.  She should remain nonweightbearing to the left upper extremity.  We will see her back in 4 weeks for further imaging and evaluation.  Okay to begin gentle wrist range of motion and finger range of motion.  In terms of her superior and inferior pubic rami fractures noted on imaging from the emergency room.  We will treat these nonoperatively.      Orders Placed This Encounter    X-Ray Wrist Complete Left

## 2025-03-11 ENCOUNTER — HOSPITAL ENCOUNTER (EMERGENCY)
Facility: HOSPITAL | Age: 75
Discharge: HOME | End: 2025-03-11
Attending: EMERGENCY MEDICINE
Payer: MEDICARE

## 2025-03-11 VITALS
RESPIRATION RATE: 18 BRPM | HEART RATE: 68 BPM | SYSTOLIC BLOOD PRESSURE: 158 MMHG | BODY MASS INDEX: 24.63 KG/M2 | OXYGEN SATURATION: 98 % | DIASTOLIC BLOOD PRESSURE: 89 MMHG | WEIGHT: 139 LBS | TEMPERATURE: 98 F

## 2025-03-11 DIAGNOSIS — Z76.0 MEDICATION REFILL: Primary | ICD-10-CM

## 2025-03-11 PROCEDURE — 99281 EMR DPT VST MAYX REQ PHY/QHP: CPT

## 2025-03-11 RX ORDER — LORAZEPAM 2 MG/1
2 TABLET ORAL 2 TIMES DAILY
Qty: 12 TABLET | Refills: 0 | Status: SHIPPED | OUTPATIENT
Start: 2025-03-11 | End: 2025-03-11

## 2025-03-11 RX ORDER — LORAZEPAM 2 MG/1
2 TABLET ORAL 2 TIMES DAILY
Qty: 12 TABLET | Refills: 0 | Status: SHIPPED | OUTPATIENT
Start: 2025-03-11 | End: 2025-03-17

## 2025-03-12 NOTE — ED PROVIDER NOTES
"Encounter Date: 3/11/2025       History     Chief Complaint   Patient presents with    Medication Refill     PT REQUESTING MED REFILL ON ATIVAN, TOOK LAST PILL TODAY AND HER PCP OUT OF TOWN TILL MONDAY.  PT HAS BEEN ON "NERVE" MED > 50 YRS.       A 74 y.o. female patient with a history of anxiety, HLD, HTN presents to the ED requesting emergency refill of her ativan due to her prescription running out today. Patient states she takes it every day and has for years. Patient states she called her doctor's office but her prescriber is out of town for 6 days until Monday. Patient states she took her last dose today PTA.     The history is provided by the patient.     Review of patient's allergies indicates:   Allergen Reactions    Gabapentin Other (See Comments)    Diphenhydramine Anxiety     Past Medical History:   Diagnosis Date    Anxiety     High cholesterol     HTN (hypertension)      Past Surgical History:   Procedure Laterality Date    COLONOSCOPY      LAPAROSCOPIC REPAIR OF VENTRAL HERNIA N/A 5/12/2022    Procedure: REPAIR, HERNIA, VENTRAL, LAPAROSCOPIC;  Surgeon: Jefferson Grant MD;  Location: [facility];  Service: General;  Laterality: N/A;    TONSILLECTOMY      TOTAL SHOULDER ARTHROPLASTY Right      Family History   Problem Relation Name Age of Onset    No Known Problems Mother      Alzheimer's disease Father       Social History[1]  Review of Systems   Constitutional:  Negative for chills and fever.   Eyes:  Negative for visual disturbance.   Respiratory:  Negative for shortness of breath.    Cardiovascular:  Negative for chest pain.   Gastrointestinal:  Negative for nausea and vomiting.   Genitourinary:  Negative for difficulty urinating and dysuria.   Musculoskeletal:  Negative for arthralgias.   Skin:  Negative for color change and rash.   Neurological:  Negative for weakness and headaches.   Hematological:  Does not bruise/bleed easily.   Psychiatric/Behavioral:  Negative for confusion.    All other " systems reviewed and are negative.      Physical Exam     Initial Vitals [03/11/25 1755]   BP Pulse Resp Temp SpO2   (!) 158/89 68 18 98.4 °F (36.9 °C) 98 %      MAP       --         Physical Exam    Nursing note and vitals reviewed.  Constitutional: She appears well-developed and well-nourished.   HENT:   Head: Normocephalic and atraumatic.   Right Ear: External ear normal.   Left Ear: External ear normal.   Nose: Nose normal. Mouth/Throat: Oropharynx is clear and moist.   Eyes: Conjunctivae and EOM are normal.   Neck: Neck supple.   Cardiovascular:  Normal rate, regular rhythm and normal heart sounds.     Exam reveals no gallop and no friction rub.       No murmur heard.  Pulmonary/Chest: Breath sounds normal. No respiratory distress. She has no wheezes. She has no rhonchi. She has no rales.   Musculoskeletal:      Cervical back: Neck supple.     Neurological: She is alert and oriented to person, place, and time. GCS score is 15. GCS eye subscore is 4. GCS verbal subscore is 5. GCS motor subscore is 6.   Skin: Skin is warm and dry. Capillary refill takes less than 2 seconds.         ED Course   Procedures  Labs Reviewed - No data to display       Imaging Results    None          Medications - No data to display  Medical Decision Making  A 74 y.o. female patient with a history of anxiety, HLD, HTN presents to the ED requesting emergency refill of her ativan due to her prescription running out today. Patient states she takes it every day and has for years. Patient states she called her doctor's office but her prescriber is out of town for 6 days until Monday. Patient states she took her last dose today PTA.      review shows patient's story is accurate with last refill 2/13/25. It does appear patient refills ativan every 30 days by the same prescriber. Will give emergency refill for 6 days so patient does not withdrawal until she sees her PCP. Patient educated that we will NOT refill this medication in the ER  again.     Patient's condition improved with the following Medications - No data to display     Clinical impression:  Medication refill (Primary)    Patient is non-toxic appearing and tolerating nutritional intake. Patient's vital signs and clinical condition are stable for DC with ED Prescriptions     Medication Sig Dispense Start Date End Date Auth. Provider    LORazepam (ATIVAN) 2 MG Tab  (Status: Discontinued) Take 1 tablet (2 mg   total) by mouth 2 (two) times daily. for 6 days 12 tablet 3/11/2025   3/11/2025 Lisa Parr PA    LORazepam (ATIVAN) 2 MG Tab Take 1 tablet (2 mg total) by mouth 2 (two)   times daily. for 6 days 12 tablet 3/11/2025 3/17/2025 Lisa Parr PA         Follow-up: PCP or Internal medicine clinic within 3 days  Referrals made: none    Strict follow-up precautions given. Patient verbalizes understanding of treatment plan and ED return precautions.         Risk  Prescription drug management.  Risk Details: Given strict ED return precautions. I have spoken with the patient and/or caregivers. I have explained the patient's condition, diagnoses and treatment plan based on the information available to me at this time. I have answered the patient's and/or caregiver's questions and addressed any concerns. The patient and/or caregivers have as good an understanding of the patient's diagnosis, condition and treatment plan as can be expected at this point. The patient's condition is stable and appropriate for discharge from the emergency department.      The patient will pursue further outpatient evaluation with the primary care physician or other designated or consulting physician as outlined in the discharge instructions. The patient and/or caregivers are agreeable to this plan of care and follow-up instructions have been explained in detail. The patient and/or caregivers have received these instructions in written format and have expressed an understanding of the discharge  instructions. The patient and/or caregivers are aware that any significant change in condition or worsening of symptoms should prompt an immediate return to this or the closest emergency department or a call to 911.               Additional MDM:   Differential Diagnosis:   Other: The following diagnoses were also considered and will be evaluated: medication refill, MSE and anxiety.                                   Clinical Impression:  Final diagnoses:  [Z76.0] Medication refill (Primary)          ED Disposition Condition    Discharge Stable          ED Prescriptions       Medication Sig Dispense Start Date End Date Auth. Provider    LORazepam (ATIVAN) 2 MG Tab  (Status: Discontinued) Take 1 tablet (2 mg total) by mouth 2 (two) times daily. for 6 days 12 tablet 3/11/2025 3/11/2025 Lisa Parr PA    LORazepam (ATIVAN) 2 MG Tab Take 1 tablet (2 mg total) by mouth 2 (two) times daily. for 6 days 12 tablet 3/11/2025 3/17/2025 Lisa Parr PA          Follow-up Information       Follow up With Specialties Details Why Contact Info Additional Information    [facility] Emergency Dept Emergency Medicine Go to  If symptoms worsen, As needed [address] [address] [phone]     [facility] Internal Medicine Internal Medicine Call in 3 days  [address] [facility] [address] [phone] Internal Medicine Clinic Entrance #1               [1]   Social History  Tobacco Use    Smoking status: Never    Smokeless tobacco: Never   Substance Use Topics    Alcohol use: Yes     Alcohol/week: 6.0 standard drinks of alcohol     Types: 6 Cans of beer per week    Drug use: Never        Lisa Parr PA  03/11/25 9968

## (undated) DEVICE — TROCAR KII SHLD BLDED 5X100MM

## (undated) DEVICE — SYR 10CC LUER LOCK

## (undated) DEVICE — NDL GRANEE OPEN LOOP GRASPER

## (undated) DEVICE — TUBING HF INSUFFLATION W/ FLTR

## (undated) DEVICE — NDL ECLIPSE HYPO 22GA 1IN

## (undated) DEVICE — SET TUB INSUFFLATION SUC 20L

## (undated) DEVICE — Device

## (undated) DEVICE — SUT 0 VICRYL / UR6 (J603)

## (undated) DEVICE — MARKER SKIN RULER AND LABEL

## (undated) DEVICE — SYS LAPAROSCOPIC FIXIATION 30

## (undated) DEVICE — DRAPE DEVON INSTRUMENT 10X16IN

## (undated) DEVICE — BINDER AB SIZE XL 12X72-96IN

## (undated) DEVICE — IRRIGATOR HYDRO-SURG PLUS 5MM

## (undated) DEVICE — IRRIGATOR SUCTION W/TIP

## (undated) DEVICE — SUPPORT ULNA NERVE PROTECTOR

## (undated) DEVICE — ADHESIVE DERMABOND ADVANCED

## (undated) DEVICE — BLANKET SNUGGLE WARM ADLT SM

## (undated) DEVICE — NDL SAFETY 22G X 1.5 ECLIPSE

## (undated) DEVICE — GLOVE PROTEXIS BLUE LATEX 8.5

## (undated) DEVICE — TROCAR ENDO Z THREAD KII 5X100

## (undated) DEVICE — GLOVE PROTEXIS LTX 6.5

## (undated) DEVICE — DISSECTOR EPIX LAPA 5MMX35CM

## (undated) DEVICE — STRAP SECURE 5MM

## (undated) DEVICE — SUT GUT PL. 4-0 27 FS-2

## (undated) DEVICE — GAUZE SPONGE 4X4 12PLY

## (undated) DEVICE — NDL PNEUMO INSUFFLATI 120MM

## (undated) DEVICE — SLEEVE COMPR KNEE LENGTH MED

## (undated) DEVICE — DRESSING TRANS 4X4 TEGADERM

## (undated) DEVICE — GLOVE PROTEXIS HYDROGEL SZ7.5

## (undated) DEVICE — GLOVE PROTEXIS HYDROGEL SZ6.5

## (undated) DEVICE — GLOVE PROTEXIS BLUE LATEX 7

## (undated) DEVICE — DRAPE INCISE IOBAN 2 23X17IN